# Patient Record
Sex: MALE | Race: WHITE | Employment: FULL TIME | URBAN - METROPOLITAN AREA
[De-identification: names, ages, dates, MRNs, and addresses within clinical notes are randomized per-mention and may not be internally consistent; named-entity substitution may affect disease eponyms.]

---

## 2017-09-21 ENCOUNTER — APPOINTMENT (OUTPATIENT)
Dept: CT IMAGING | Facility: HOSPITAL | Age: 52
DRG: 391 | End: 2017-09-21
Attending: EMERGENCY MEDICINE
Payer: COMMERCIAL

## 2017-09-21 ENCOUNTER — HOSPITAL ENCOUNTER (INPATIENT)
Facility: HOSPITAL | Age: 52
LOS: 4 days | Discharge: HOME | DRG: 391 | End: 2017-09-25
Attending: EMERGENCY MEDICINE | Admitting: HOSPITALIST
Payer: COMMERCIAL

## 2017-09-21 DIAGNOSIS — K57.92 ACUTE DIVERTICULITIS: Primary | ICD-10-CM

## 2017-09-21 PROBLEM — K63.5 COLON POLYPS: Status: ACTIVE | Noted: 2017-09-21

## 2017-09-21 PROBLEM — Z90.49 HISTORY OF APPENDECTOMY: Status: ACTIVE | Noted: 2017-09-21

## 2017-09-21 PROBLEM — Z90.49 HISTORY OF COLON RESECTION: Status: ACTIVE | Noted: 2017-09-21

## 2017-09-21 PROBLEM — Z80.0 FAMILY HISTORY OF COLON CANCER: Status: ACTIVE | Noted: 2017-09-21

## 2017-09-21 PROBLEM — S32.000A LUMBAR COMPRESSION FRACTURE (HCC): Status: ACTIVE | Noted: 2017-09-21

## 2017-09-21 PROBLEM — R10.32 ABDOMINAL PAIN, ACUTE, LEFT LOWER QUADRANT: Status: ACTIVE | Noted: 2017-09-21

## 2017-09-21 LAB
ALBUMIN SERPL-MCNC: 4 G/DL (ref 3.5–4.8)
ALP LIVER SERPL-CCNC: 41 U/L (ref 45–117)
ALT SERPL-CCNC: 21 U/L (ref 17–63)
AST SERPL-CCNC: 23 U/L (ref 15–41)
ATRIAL RATE: 69 BPM
BASOPHILS # BLD AUTO: 0.07 X10(3) UL (ref 0–0.1)
BASOPHILS NFR BLD AUTO: 0.5 %
BILIRUB SERPL-MCNC: 0.7 MG/DL (ref 0.1–2)
BILIRUB UR QL STRIP.AUTO: NEGATIVE
BUN BLD-MCNC: 17 MG/DL (ref 8–20)
CALCIUM BLD-MCNC: 9.1 MG/DL (ref 8.3–10.3)
CHLORIDE: 106 MMOL/L (ref 101–111)
CLARITY UR REFRACT.AUTO: CLEAR
CO2: 25 MMOL/L (ref 22–32)
COLOR UR AUTO: YELLOW
CREAT BLD-MCNC: 0.88 MG/DL (ref 0.7–1.3)
EOSINOPHIL # BLD AUTO: 0.09 X10(3) UL (ref 0–0.3)
EOSINOPHIL NFR BLD AUTO: 0.7 %
ERYTHROCYTE [DISTWIDTH] IN BLOOD BY AUTOMATED COUNT: 11.9 % (ref 11.5–16)
GLUCOSE BLD-MCNC: 105 MG/DL (ref 65–99)
GLUCOSE BLD-MCNC: 105 MG/DL (ref 70–99)
GLUCOSE UR STRIP.AUTO-MCNC: NEGATIVE MG/DL
HCT VFR BLD AUTO: 39.8 % (ref 37–53)
HGB BLD-MCNC: 13.7 G/DL (ref 13–17)
IMMATURE GRANULOCYTE COUNT: 0.05 X10(3) UL (ref 0–1)
IMMATURE GRANULOCYTE RATIO %: 0.4 %
KETONES UR STRIP.AUTO-MCNC: NEGATIVE MG/DL
LACTIC ACID: 0.7 MMOL/L (ref 0.5–2)
LEUKOCYTE ESTERASE UR QL STRIP.AUTO: NEGATIVE
LYMPHOCYTES # BLD AUTO: 1.84 X10(3) UL (ref 0.9–4)
LYMPHOCYTES NFR BLD AUTO: 14 %
M PROTEIN MFR SERPL ELPH: 7.6 G/DL (ref 6.1–8.3)
MCH RBC QN AUTO: 31.4 PG (ref 27–33.2)
MCHC RBC AUTO-ENTMCNC: 34.4 G/DL (ref 31–37)
MCV RBC AUTO: 91.3 FL (ref 80–99)
MONOCYTES # BLD AUTO: 1.1 X10(3) UL (ref 0.1–0.6)
MONOCYTES NFR BLD AUTO: 8.3 %
NEUTROPHIL ABS PRELIM: 10.03 X10 (3) UL (ref 1.3–6.7)
NEUTROPHILS # BLD AUTO: 10.03 X10(3) UL (ref 1.3–6.7)
NEUTROPHILS NFR BLD AUTO: 76.1 %
NITRITE UR QL STRIP.AUTO: NEGATIVE
P AXIS: 56 DEGREES
P-R INTERVAL: 146 MS
PH UR STRIP.AUTO: 7 [PH] (ref 4.5–8)
PLATELET # BLD AUTO: 228 10(3)UL (ref 150–450)
POTASSIUM SERPL-SCNC: 4.4 MMOL/L (ref 3.6–5.1)
PROT UR STRIP.AUTO-MCNC: NEGATIVE MG/DL
Q-T INTERVAL: 404 MS
QRS DURATION: 88 MS
QTC CALCULATION (BEZET): 432 MS
R AXIS: 29 DEGREES
RBC # BLD AUTO: 4.36 X10(6)UL (ref 4.3–5.7)
RBC UR QL AUTO: NEGATIVE
RED CELL DISTRIBUTION WIDTH-SD: 40 FL (ref 35.1–46.3)
SODIUM SERPL-SCNC: 139 MMOL/L (ref 136–144)
SP GR UR STRIP.AUTO: 1.01 (ref 1–1.03)
T AXIS: 35 DEGREES
UROBILINOGEN UR STRIP.AUTO-MCNC: 0.2 MG/DL
VENTRICULAR RATE: 69 BPM
WBC # BLD AUTO: 13.2 X10(3) UL (ref 4–13)

## 2017-09-21 PROCEDURE — 74177 CT ABD & PELVIS W/CONTRAST: CPT | Performed by: EMERGENCY MEDICINE

## 2017-09-21 PROCEDURE — 99284 EMERGENCY DEPT VISIT MOD MDM: CPT | Performed by: COLON & RECTAL SURGERY

## 2017-09-21 PROCEDURE — 99223 1ST HOSP IP/OBS HIGH 75: CPT | Performed by: HOSPITALIST

## 2017-09-21 RX ORDER — SODIUM CHLORIDE 9 MG/ML
INJECTION, SOLUTION INTRAVENOUS CONTINUOUS
Status: ACTIVE | OUTPATIENT
Start: 2017-09-21 | End: 2017-09-21

## 2017-09-21 RX ORDER — KETOROLAC TROMETHAMINE 30 MG/ML
30 INJECTION, SOLUTION INTRAMUSCULAR; INTRAVENOUS EVERY 6 HOURS PRN
Status: DISPENSED | OUTPATIENT
Start: 2017-09-21 | End: 2017-09-23

## 2017-09-21 RX ORDER — ONDANSETRON 2 MG/ML
4 INJECTION INTRAMUSCULAR; INTRAVENOUS EVERY 4 HOURS PRN
Status: DISCONTINUED | OUTPATIENT
Start: 2017-09-21 | End: 2017-09-21 | Stop reason: ALTCHOICE

## 2017-09-21 RX ORDER — KETOROLAC TROMETHAMINE 30 MG/ML
15 INJECTION, SOLUTION INTRAMUSCULAR; INTRAVENOUS EVERY 6 HOURS PRN
Status: DISPENSED | OUTPATIENT
Start: 2017-09-21 | End: 2017-09-23

## 2017-09-21 RX ORDER — HYDROMORPHONE HYDROCHLORIDE 1 MG/ML
1 INJECTION, SOLUTION INTRAMUSCULAR; INTRAVENOUS; SUBCUTANEOUS EVERY 30 MIN PRN
Status: DISCONTINUED | OUTPATIENT
Start: 2017-09-21 | End: 2017-09-21

## 2017-09-21 RX ORDER — ACETAMINOPHEN 500 MG
1000 TABLET ORAL EVERY 6 HOURS PRN
Status: DISCONTINUED | OUTPATIENT
Start: 2017-09-21 | End: 2017-09-25

## 2017-09-21 RX ORDER — HYDROMORPHONE HYDROCHLORIDE 1 MG/ML
1 INJECTION, SOLUTION INTRAMUSCULAR; INTRAVENOUS; SUBCUTANEOUS
Status: DISCONTINUED | OUTPATIENT
Start: 2017-09-21 | End: 2017-09-25

## 2017-09-21 RX ORDER — ONDANSETRON 2 MG/ML
4 INJECTION INTRAMUSCULAR; INTRAVENOUS ONCE
Status: COMPLETED | OUTPATIENT
Start: 2017-09-21 | End: 2017-09-21

## 2017-09-21 RX ORDER — SODIUM CHLORIDE 9 MG/ML
INJECTION, SOLUTION INTRAVENOUS CONTINUOUS
Status: DISCONTINUED | OUTPATIENT
Start: 2017-09-21 | End: 2017-09-25

## 2017-09-21 RX ORDER — HYDROMORPHONE HYDROCHLORIDE 1 MG/ML
0.5 INJECTION, SOLUTION INTRAMUSCULAR; INTRAVENOUS; SUBCUTANEOUS EVERY 30 MIN PRN
Status: DISCONTINUED | OUTPATIENT
Start: 2017-09-21 | End: 2017-09-21 | Stop reason: ALTCHOICE

## 2017-09-21 RX ORDER — SODIUM CHLORIDE 9 MG/ML
INJECTION, SOLUTION INTRAVENOUS CONTINUOUS
Status: DISCONTINUED | OUTPATIENT
Start: 2017-09-21 | End: 2017-09-21

## 2017-09-21 RX ORDER — DIPHENHYDRAMINE HCL 25 MG
25 CAPSULE ORAL EVERY 4 HOURS PRN
Status: DISCONTINUED | OUTPATIENT
Start: 2017-09-21 | End: 2017-09-25

## 2017-09-21 RX ORDER — HYDROMORPHONE HYDROCHLORIDE 1 MG/ML
1 INJECTION, SOLUTION INTRAMUSCULAR; INTRAVENOUS; SUBCUTANEOUS ONCE
Status: COMPLETED | OUTPATIENT
Start: 2017-09-21 | End: 2017-09-21

## 2017-09-21 RX ORDER — ONDANSETRON 2 MG/ML
4 INJECTION INTRAMUSCULAR; INTRAVENOUS EVERY 6 HOURS PRN
Status: DISCONTINUED | OUTPATIENT
Start: 2017-09-21 | End: 2017-09-25

## 2017-09-21 RX ORDER — HYDROMORPHONE HYDROCHLORIDE 1 MG/ML
1 INJECTION, SOLUTION INTRAMUSCULAR; INTRAVENOUS; SUBCUTANEOUS EVERY 4 HOURS PRN
Status: DISCONTINUED | OUTPATIENT
Start: 2017-09-21 | End: 2017-09-21

## 2017-09-21 NOTE — ED PROVIDER NOTES
Patient Seen in: [facility] Emergency Department    History   Patient presents with:  Abdomen/Flank Pain (GI/)    Stated Complaint: abd pain    HPI    79-year-old male with history of diverticulosis presents emergency room with chief complaint of pain  Other systems are as noted in HPI. Constitutional and vital signs reviewed. All other systems reviewed and negative except as noted above. PSFH elements reviewed from today and agreed except as otherwise stated in HPI.     Physical Exam   ED Abnormal            Final result                 Please view results for these tests on the individual orders.    URINALYSIS WITH CULTURE REFLEX       CT ABDOMEN PELVIS WITH CONTRAST      IMPRESSION:  Pronounced soft tissue stranding is noted adjacent t specified.     Medications Prescribed:  Current Discharge Medication List        Present on Admission  Date Reviewed: 10/14/2016          ICD-10-CM Noted POA    Acute diverticulitis K57.92 10/14/2016 Unknown

## 2017-09-21 NOTE — PROGRESS NOTES
Addendum  Patient seen and examined  Ill appearing.   Dizzy and diaphoretic but no vision deficits  Chest: CTA B/L  CVS: S1, S2, RRR  ABD: Soft, epigastric tenderness, RLQ tenderness with guarding  EXT: No c/c    Imaging: Reviewed    Suspecting sepsis 2/2 d

## 2017-09-21 NOTE — CONSULTS
[facility]  Report of Consultation    Tremayne Sarafi Patient Status:  Emergency    1965 MRN [id number]   Location [address] Attending No att. providers found   Hosp Day # 0 PCP Palak Garsia MD     Reason for [facility] ([facility]) he \"has a bad back\" from previous injury. Apparently the patient fell off a roof and has at least 3 lumbar vertebrae that are fractured. Patient's pain is 4/10 now. Is worse with movement or any examination of the abdomen.     Past medical history: L Jose Luis Wright MD;  Location: [facility]  2000: ORTHOPEDIC SURG (PBP) Left      Comment: knee arthroscopy  Family History   Problem Relation Age of Onset   • Cancer Father    • Hypertension Father    • Cancer Mother       reports that he has never clubbing or cyanosis. Skin: Normal texture and turgor. Laboratory Data:  I have personally reviewed the patient's CT scan and have provided my own interpretation.   There is a focus of diverticulitis near what would appear to be the descending sig adenopathy. BOWEL/MESENTERY:  There is a 10 cm segment of marked acute diverticulitis within the splenic flexure to mid left colon with infiltration of the fat with some thickening of the left anterior pararenal space and left paracolic gutter.  No absce COMPARISON:     [facility] , CT ABD(C/S)/PELVIS(C) (SET), 10/26/2009,        13:17.                INDICATIONS:     llq pain, rule out diverticulitis               TECHNIQUE:     CT scanning was performed from the dome of the        diaphragm to the pubic sy from the dome of the diaphragm to the pubic symphysis with non-ionic intravenous contrast material. Post contrast coronal MPR imaging was performed. Dose reduction techniques were used.  Dose information is   transmitted to the [facility] localized perforation cannot be excluded. Agree with preliminary radiology report from [address] [place] [address] radiology.               Dictated by: Nikkie Collado MD on 9/21/2017 at 6:00       Approved by: Nikkie Collado MD                  Recent Labs   Lab  0 may require urgent surgical intervention on this admission. 4. I explained and counseled the patient on all the above listed findings and potential outcomes.   He is aware of the treatment course of IV antibiotics, with short hospitalization, followed by c

## 2017-09-21 NOTE — CM/SW NOTE
Patient was screened during rounds and no needs are identified at this time. RN to contact SW/CM if needs arise. 09/21/17 1500   CM/SW Screening   Referral [phone] staff; Chart review;Nursing rounds   Patient's Ment

## 2017-09-21 NOTE — H&P
[facility] HOSPITALIST  History and Physical     Kolby Greene Patient Status:  Emergency    1965 MRN [id number]   Location [address] [facility] [address] Attending Anton Guevara, [facility] Day # 0 PCP Jossie Armenta MD     Chief Complaint: Carmen Bates Father    • Hypertension Father    • Cancer Mother        Allergies: No Known Allergies    Medications:    No current facility-administered medications on file prior to encounter.    Current Outpatient Prescriptions on File Prior to Encounter:  Acetaminophe data reviewed in Epic. ASSESSMENT / PLAN:     1. Acute diverticulitis with possible microperforation  1. NPO  2. IVF  3. Pain meds  4. IV zosyn  2. Chronic back pain  3.  Leukocytosis          Quality:  · DVT Prophylaxis: SCDS  · CODE status: full  · F

## 2017-09-22 LAB
BASOPHILS # BLD AUTO: 0.06 X10(3) UL (ref 0–0.1)
BASOPHILS NFR BLD AUTO: 0.4 %
BUN BLD-MCNC: 14 MG/DL (ref 8–20)
CALCIUM BLD-MCNC: 8.7 MG/DL (ref 8.3–10.3)
CHLORIDE: 105 MMOL/L (ref 101–111)
CO2: 28 MMOL/L (ref 22–32)
CREAT BLD-MCNC: 0.89 MG/DL (ref 0.7–1.3)
EOSINOPHIL # BLD AUTO: 0.14 X10(3) UL (ref 0–0.3)
EOSINOPHIL NFR BLD AUTO: 0.9 %
ERYTHROCYTE [DISTWIDTH] IN BLOOD BY AUTOMATED COUNT: 12.1 % (ref 11.5–16)
GLUCOSE BLD-MCNC: 86 MG/DL (ref 70–99)
HCT VFR BLD AUTO: 40.2 % (ref 37–53)
HGB BLD-MCNC: 13.2 G/DL (ref 13–17)
IMMATURE GRANULOCYTE COUNT: 0.05 X10(3) UL (ref 0–1)
IMMATURE GRANULOCYTE RATIO %: 0.3 %
LYMPHOCYTES # BLD AUTO: 2.94 X10(3) UL (ref 0.9–4)
LYMPHOCYTES NFR BLD AUTO: 19.6 %
MCH RBC QN AUTO: 30.8 PG (ref 27–33.2)
MCHC RBC AUTO-ENTMCNC: 32.8 G/DL (ref 31–37)
MCV RBC AUTO: 93.9 FL (ref 80–99)
MONOCYTES # BLD AUTO: 1.25 X10(3) UL (ref 0.1–0.6)
MONOCYTES NFR BLD AUTO: 8.3 %
NEUTROPHIL ABS PRELIM: 10.57 X10 (3) UL (ref 1.3–6.7)
NEUTROPHILS # BLD AUTO: 10.57 X10(3) UL (ref 1.3–6.7)
NEUTROPHILS NFR BLD AUTO: 70.5 %
PLATELET # BLD AUTO: 250 10(3)UL (ref 150–450)
POTASSIUM SERPL-SCNC: 3.9 MMOL/L (ref 3.6–5.1)
RBC # BLD AUTO: 4.28 X10(6)UL (ref 4.3–5.7)
RED CELL DISTRIBUTION WIDTH-SD: 42 FL (ref 35.1–46.3)
SODIUM SERPL-SCNC: 139 MMOL/L (ref 136–144)
WBC # BLD AUTO: 15 X10(3) UL (ref 4–13)

## 2017-09-22 PROCEDURE — 99232 SBSQ HOSP IP/OBS MODERATE 35: CPT | Performed by: HOSPITALIST

## 2017-09-22 PROCEDURE — 99233 SBSQ HOSP IP/OBS HIGH 50: CPT | Performed by: COLON & RECTAL SURGERY

## 2017-09-22 NOTE — PROGRESS NOTES
[facility]  Progress Note    Fabiola Minor Patient Status:  Inpatient    1965 MRN [id number]   [facility] 5NW-A Attending Osmany Fontanez MD   Hosp Day # 1 PCP Marc Mathis MD     Subjective:  Patient up to shower.  Abdominal linda diverticulitis     Abdominal pain, acute, left lower quadrant     Sepsis ([address]      Acute diverticulitis, clinically improving  History of low anterior resection for diverticulitis  White count further elevated this morning, however due to an IV error anti

## 2017-09-22 NOTE — PLAN OF CARE
GASTROINTESTINAL - ADULT    • Minimal or absence of nausea and vomiting Progressing    • Maintains or returns to baseline bowel function Progressing    • Maintains adequate nutritional intake (undernourished) Progressing        HEMATOLOGIC - ADULT    • Holli

## 2017-09-22 NOTE — PROGRESS NOTES
NURSING ADMISSION NOTE      Patient admitted via Ambulance. Oriented to room. Safety precautions initiated. Bed in low position. Call light in reach. Patient alert and oriented. C/o \"feeling lightheaded\" and diaphoretic. VSS. Dr. Zarina Mora paged.

## 2017-09-22 NOTE — PLAN OF CARE
GASTROINTESTINAL - ADULT    • Minimal or absence of nausea Progressing    • Maintains or returns to baseline bowel function Progressing          PAIN - ADULT    • Verbalizes/displays adequate comfort level or patient's stated pain goal Progressing

## 2017-09-22 NOTE — PROGRESS NOTES
[facility]  Progress Note     Chang Wong Patient Status:  Inpatient    1965 MRN [id number]   [facility] 5NW-A Attending Royal Vivas MD   Hosp Day # 1 PCP Sravani Cheng MD     Chief Complaint: LLQ pain    S: Patient repo pain with radiation to groin/LLE, nondistended  2. Continue Zosyn IV, npo, IVF, pain mgmt  2. Possible peritonitis 2/2 #1, some guarding on exam  3.  Leukocytosis 2/2 #1    Quality:  · DVT Prophylaxis: scds  · CODE status: full    Estimated date of discharg

## 2017-09-23 ENCOUNTER — APPOINTMENT (OUTPATIENT)
Dept: CT IMAGING | Facility: HOSPITAL | Age: 52
DRG: 391 | End: 2017-09-23
Attending: COLON & RECTAL SURGERY
Payer: COMMERCIAL

## 2017-09-23 LAB
BASOPHILS # BLD AUTO: 0.05 X10(3) UL (ref 0–0.1)
BASOPHILS NFR BLD AUTO: 0.6 %
EOSINOPHIL # BLD AUTO: 0.19 X10(3) UL (ref 0–0.3)
EOSINOPHIL NFR BLD AUTO: 2.1 %
ERYTHROCYTE [DISTWIDTH] IN BLOOD BY AUTOMATED COUNT: 11.5 % (ref 11.5–16)
HCT VFR BLD AUTO: 36.7 % (ref 37–53)
HGB BLD-MCNC: 12.5 G/DL (ref 13–17)
IMMATURE GRANULOCYTE COUNT: 0.02 X10(3) UL (ref 0–1)
IMMATURE GRANULOCYTE RATIO %: 0.2 %
LYMPHOCYTES # BLD AUTO: 1.9 X10(3) UL (ref 0.9–4)
LYMPHOCYTES NFR BLD AUTO: 21 %
MCH RBC QN AUTO: 31.3 PG (ref 27–33.2)
MCHC RBC AUTO-ENTMCNC: 34.1 G/DL (ref 31–37)
MCV RBC AUTO: 91.8 FL (ref 80–99)
MONOCYTES # BLD AUTO: 0.71 X10(3) UL (ref 0.1–0.6)
MONOCYTES NFR BLD AUTO: 7.9 %
NEUTROPHIL ABS PRELIM: 6.17 X10 (3) UL (ref 1.3–6.7)
NEUTROPHILS # BLD AUTO: 6.17 X10(3) UL (ref 1.3–6.7)
NEUTROPHILS NFR BLD AUTO: 68.2 %
PLATELET # BLD AUTO: 227 10(3)UL (ref 150–450)
RBC # BLD AUTO: 4 X10(6)UL (ref 4.3–5.7)
RED CELL DISTRIBUTION WIDTH-SD: 38.9 FL (ref 35.1–46.3)
WBC # BLD AUTO: 9 X10(3) UL (ref 4–13)

## 2017-09-23 PROCEDURE — 99232 SBSQ HOSP IP/OBS MODERATE 35: CPT | Performed by: HOSPITALIST

## 2017-09-23 PROCEDURE — 74177 CT ABD & PELVIS W/CONTRAST: CPT | Performed by: COLON & RECTAL SURGERY

## 2017-09-23 NOTE — PLAN OF CARE
Received patient a/ox4. Continues to c/o of abdominal tenderness and left hip/leg pain and back pain. He states the hip/leg/back pain is worse than the abdominal discomfort. He ambulated the halls lastnight and this morning and tolerated well.  He was updat

## 2017-09-23 NOTE — PROGRESS NOTES
[facility]  Progress Note    Tremayne Wagner Patient Status:  Inpatient    1965 MRN [id number]   [facility] 5NW-A Attending Chrystal Oppenheim, MD   Hosp Day # 2 PCP Palak Garsia MD     Subjective:  No new complaints, sever left flank psoas sign  Significant medical and surgical decision making today regarding potential psoas abscess, counseled patient significantly regarding need for CT and it's possible outcomes. Plan:  1. IV's, Ambulate, DVT prophylaxis  2. NPO except H20  3.  CT sc

## 2017-09-23 NOTE — PROGRESS NOTES
[facility]  Progress Note     Macrina Hernandez Patient Status:  Inpatient    1965 MRN [id number]   [facility] 5NW-A Attending Lashawn Boogie MD   Hosp Day # 2 PCP Cas Gordon MD     Chief Complaint: LLQ pain    S: Patient repo 3.375 g Intravenous Q8H       ASSESSMENT / PLAN:     1. Sepsis 2/2 acute diverticulitis. Lflank and lower quadrant pain- worsening. Possible periotonitis/perforation vs psoas abscess/inflammation  1. Repeat CT abd/pelvis today  2.  Appreciate Dr. Eryn Odom

## 2017-09-23 NOTE — PROGRESS NOTES
I personally reviewed the images of the CT scan of the abdomen and pelvis performed on 9/23/2017. I agree with the radiologist interpretation.   Overall the inflammation in the left lower quadrant around the sigmoid colon has improved when compared to the

## 2017-09-24 PROCEDURE — 99232 SBSQ HOSP IP/OBS MODERATE 35: CPT | Performed by: HOSPITALIST

## 2017-09-24 RX ORDER — KETOROLAC TROMETHAMINE 30 MG/ML
15 INJECTION, SOLUTION INTRAMUSCULAR; INTRAVENOUS EVERY 6 HOURS PRN
Status: DISCONTINUED | OUTPATIENT
Start: 2017-09-24 | End: 2017-09-25

## 2017-09-24 RX ORDER — KETOROLAC TROMETHAMINE 30 MG/ML
30 INJECTION, SOLUTION INTRAMUSCULAR; INTRAVENOUS EVERY 6 HOURS PRN
Status: DISCONTINUED | OUTPATIENT
Start: 2017-09-24 | End: 2017-09-25

## 2017-09-24 NOTE — PROGRESS NOTES
09/24/17 1513   Clinical Encounter Type   Visited With Patient and family together   Routine Visit Introduction   Continue Visiting No   Patient Spiritual Encounters   Spiritual Interventions  provided emotional support and blessing

## 2017-09-24 NOTE — PROGRESS NOTES
[facility]  Progress Note    Irl Stalling Patient Status:  Inpatient    1965 MRN [id number]   [facility] 5NW-A Attending Denisse Peoples MD   Hosp Day # 3 PCP Nora Key MD     Subjective:  No acute events overnight.   Patient is tolerating a clear liquid diet today. He remains afebrile. We will advance him today to a soft low fiber diet. The patient was instructed to go very slow with increasing the amount of his oral intake. Continue IV antibiotics for now.       I spent  3

## 2017-09-24 NOTE — PROGRESS NOTES
[facility]  Progress Note     Tremayne Wagner Patient Status:  Inpatient    1965 MRN [id number]   [facility] 5NW-A Attending Chrystal Oppenheim, MD   Hosp Day # 3 PCP Palak Garsia MD     Chief Complaint: LLQ pain    S: Patient repo surgery  4. Continue Zosyn IV, npo, IVF, pain mgmt  2. Possible peritonitis 2/2 #1- plan as above   3. LLE pain 2/2 psoas inflammation  4.  Leukocytosis 2/2 #1, resolved    Quality:  · DVT Prophylaxis: scds  · CODE status: full    Estimated date of discharg

## 2017-09-25 VITALS
OXYGEN SATURATION: 94 % | WEIGHT: 190.63 LBS | TEMPERATURE: 98 F | RESPIRATION RATE: 19 BRPM | HEIGHT: 72 IN | HEART RATE: 60 BPM | BODY MASS INDEX: 25.82 KG/M2 | DIASTOLIC BLOOD PRESSURE: 85 MMHG | SYSTOLIC BLOOD PRESSURE: 144 MMHG

## 2017-09-25 PROCEDURE — 99233 SBSQ HOSP IP/OBS HIGH 50: CPT | Performed by: COLON & RECTAL SURGERY

## 2017-09-25 PROCEDURE — 99239 HOSP IP/OBS DSCHRG MGMT >30: CPT | Performed by: HOSPITALIST

## 2017-09-25 RX ORDER — AMOXICILLIN AND CLAVULANATE POTASSIUM 875; 125 MG/1; MG/1
1 TABLET, FILM COATED ORAL 2 TIMES DAILY
Qty: 28 TABLET | Refills: 0 | Status: SHIPPED | OUTPATIENT
Start: 2017-09-25 | End: 2017-10-09

## 2017-09-25 RX ORDER — METRONIDAZOLE 250 MG/1
250 TABLET ORAL 3 TIMES DAILY
Qty: 42 TABLET | Refills: 0 | Status: SHIPPED | OUTPATIENT
Start: 2017-09-25 | End: 2017-10-09

## 2017-09-25 RX ORDER — HYDROCODONE BITARTRATE AND ACETAMINOPHEN 5; 325 MG/1; MG/1
1-2 TABLET ORAL
Qty: 20 TABLET | Refills: 0 | Status: SHIPPED | OUTPATIENT
Start: 2017-09-25 | End: 2017-10-05 | Stop reason: ALTCHOICE

## 2017-09-25 NOTE — DISCHARGE SUMMARY
[facility] [place] [facility]  DISCHARGE SUMMARY     Samantha Fernando Patient Status:  Inpatient    1965 MRN [id number]   [facility] 5NW-A Attending Nick Sanchez MD   [facility] Day # 4 PCP Kandice Diaz MD     Date of Admission: 2017  Date of Fady Gilman consulted. He continued on IV fluids, antiemetics, pain management. There are signs of possible peritonitis and patient had repeat CT scan due to ongoing pain with movement of left lower extremity.   His pain continues to improve and diet was advanced as visit in 1 week  For surgical planning, follow up diverticulitis    Sondra Avila, [address] [place] [address]    Schedule an appointment as soon as possible for a visit in 1 week        -----------------------------------

## 2017-09-25 NOTE — PROGRESS NOTES
NURSING DISCHARGE NOTE    Discharged Home via Wheelchair. Accompanied by Support staff  Belongings Taken by patient/family. Patient alert and oriented. VSS. Iv discontinued. Discharge instructions and prescriptions given to patient.  He verbalized u

## 2017-09-25 NOTE — PROGRESS NOTES
[facility]  Progress Note    Laney Palomino Patient Status:  Inpatient    1965 MRN [id number]   [facility] 5NW-A Attending Kirti Diaz MD   Hosp Day # 4 PCP Triston Perkins MD     Subjective:  Patient sitting up in bed, pain imp Dr. Kaye Gave for surgical planning next week    I spent 26 minutes face to face with the patient. More than 50% of that time was spent counseling the patient and/or on coordination of care.  The diagnosis, prognosis, and general treatment was explained to the

## 2017-09-25 NOTE — PROGRESS NOTES
[facility]  Progress Note     Ranjan Aguilera Patient Status:  Inpatient    1965 MRN [id number]   [facility] 5NW-A Attending Roger Jones MD   Hosp Day # 4 PCP Giuliana Jama MD     Chief Complaint: LLQ pain    S: Patient repo 2/2 #1, resolved    Quality:  · DVT Prophylaxis: scds  · CODE status: full    Estimated date of discharge: later today if does ok with lunch.      Plan of care discussed with patient, RN, surgery PA.  abx were ordered at d/c per surgery    Marie Boyd

## 2017-09-26 NOTE — PAYOR COMM NOTE
--------------  DISCHARGE REVIEW    Payor: [address] #:  [id number]  Authorization Number: [id number]    Admit date: 9/21/17  Admit time:  0813    Discharge Date: 9/25/2017  home

## 2017-10-05 ENCOUNTER — OFFICE VISIT (OUTPATIENT)
Dept: SURGERY | Facility: CLINIC | Age: 52
End: 2017-10-05

## 2017-10-05 VITALS
WEIGHT: 183 LBS | RESPIRATION RATE: 16 BRPM | SYSTOLIC BLOOD PRESSURE: 142 MMHG | DIASTOLIC BLOOD PRESSURE: 98 MMHG | HEIGHT: 72 IN | HEART RATE: 67 BPM | BODY MASS INDEX: 24.79 KG/M2

## 2017-10-05 DIAGNOSIS — R10.32 ABDOMINAL PAIN, ACUTE, LEFT LOWER QUADRANT: ICD-10-CM

## 2017-10-05 DIAGNOSIS — Z90.49 HISTORY OF APPENDECTOMY: ICD-10-CM

## 2017-10-05 DIAGNOSIS — Z80.0 FAMILY HISTORY OF COLON CANCER: ICD-10-CM

## 2017-10-05 DIAGNOSIS — K57.92 ACUTE DIVERTICULITIS: Primary | ICD-10-CM

## 2017-10-05 DIAGNOSIS — D12.6 ADENOMATOUS POLYP OF COLON, UNSPECIFIED PART OF COLON: ICD-10-CM

## 2017-10-05 DIAGNOSIS — Z90.49 HISTORY OF COLON RESECTION: ICD-10-CM

## 2017-10-05 PROCEDURE — 99215 OFFICE O/P EST HI 40 MIN: CPT | Performed by: COLON & RECTAL SURGERY

## 2017-10-05 NOTE — PATIENT INSTRUCTIONS
This patient presents for further acute and long-term surgical decision making regarding recurrent diverticulitis. This patient has resolving diverticulitis, multiply recurrent.   This patient has had prior right side colitis, prior transverse colon dive current episode of descending/sigmoid junction diverticulitis. It is difficult to say that a single operation of removal of the current acute focus would be his best result and answer. He would be left with only a small amount of colon.   The transverse

## 2017-10-05 NOTE — H&P
New Patient Visit Note       Active Problems      1. Acute diverticulitis    2. Abdominal pain, acute, left lower quadrant    3. Adenomatous polyp of colon, unspecified part of colon    4.  Family history of colon cancer, both mother and father at age 62, b deep left lower quadrant pain to moderate activity.     This patient's difficult surgical decision making involves the fact that he has had previous colon resection for diverticulitis, previous right-sided colitis, previous transverse colon diverticulitis, first cousins with colon cancer? yes     Are you taking aspirin? no   Are you taking Motrin, Naprosyn, Aleve or ibuprofen? yes     Are you taking prednisone or other steroids? no   Are you taking arthritis medicine other than prednisone?  yes   Do you take Number of children:               Social History Main Topics    Smoking status: Never Smoker                                                                Smokeless tobacco: Never Used                        Alcohol use:  Yes                Comment: we tenderness in the left lower quadrant. Clinical exam reveals the abdomen to be soft, nontender, nondistended, good bowel sounds. His only tenderness is to very deep palpation in the left lower quadrant.   He has a previous well-healed lengthy hand-as is giving him some nausea and indigestion. He points to his left lower quadrant he has some remaining left lower quadrant tenderness.     This patient overall feels greatly improved, and only has minimal residual deep left lower quadrant pain to moderate

## 2017-11-07 ENCOUNTER — OFFICE VISIT (OUTPATIENT)
Dept: SURGERY | Facility: CLINIC | Age: 52
End: 2017-11-07

## 2017-11-07 VITALS
HEIGHT: 72 IN | DIASTOLIC BLOOD PRESSURE: 80 MMHG | WEIGHT: 185 LBS | SYSTOLIC BLOOD PRESSURE: 112 MMHG | BODY MASS INDEX: 25.06 KG/M2

## 2017-11-07 DIAGNOSIS — Z90.49 HISTORY OF APPENDECTOMY: ICD-10-CM

## 2017-11-07 DIAGNOSIS — Z90.49 HISTORY OF COLON RESECTION: ICD-10-CM

## 2017-11-07 DIAGNOSIS — K57.92 ACUTE DIVERTICULITIS: Primary | ICD-10-CM

## 2017-11-07 DIAGNOSIS — D12.6 ADENOMATOUS POLYP OF COLON, UNSPECIFIED PART OF COLON: ICD-10-CM

## 2017-11-07 DIAGNOSIS — Z80.0 FAMILY HISTORY OF COLON CANCER: ICD-10-CM

## 2017-11-07 PROCEDURE — 99214 OFFICE O/P EST MOD 30 MIN: CPT | Performed by: COLON & RECTAL SURGERY

## 2017-11-08 NOTE — PATIENT INSTRUCTIONS
This patient presents for continued evaluation, treatment, and surgical decision making regarding recurrent episodes of diverticulitis. This patient has a resolving episode of diverticulitis.   The patient has had prior right-sided colitis, prior transve surgical decision making.   The patient has had previous colon resection for diverticulitis as well as previous right-sided colitis, previous transverse colonic diverticulitis, and most recently an episode of descending/sigmoid junction diverticulitis with

## 2017-11-08 NOTE — PROGRESS NOTES
Follow Up Visit Note       Active Problems      1. Acute diverticulitis    2. Adenomatous polyp of colon, unspecified part of colon    3. Family history of colon cancer, both mother and father at age 62, both  from the disease    4.  History of appendec The physician performed all medical decision making. Presley French PA-C    My total face time with this patient was 30 minutes. Greater than half of our visit was spent in counseling the patient on the above listed diagnoses and treatment options. Smokeless tobacco: Never Used                        Alcohol use: Yes                Comment: weekends    Drug use: No                 Current Outpatient Prescriptions:  Naproxen Sodium (ALEVE OR) Take by mouth.  Disp:  Rfl:    Acetamin non-ionic intravenous contrast material. Post contrast coronal MPR imaging was performed. Dose reduction techniques were used.  Dose information is   transmitted to the [facility] ([address] [place] [address] of Radiology) Lucila Flores [address] ([address]) which inclu bony lesion or fracture. LUNG BASES:  Normal.  No visible pulmonary or pleural disease.       =====  CONCLUSION:    1.  There is a approximately 6 cm segment of acute sigmoid diverticulitis with a mild to moderate degree of surrounding inflammatory benson rectum. The patient's most recent colonoscopy was in June 2017 by another surgeon.   Additionally, the patient has a substantial family history of colon cancer in his mother, father, and paternal grandmother all of who were diagnosed with colon cancer at follow-up in 3 months time. Again, if the patient has any symptoms prior to his scheduled appointment that in any way reflect his previous colitis or diverticulitis he should contact our office immediately.   Additionally, the patient will be due for colon

## 2018-10-30 ENCOUNTER — APPOINTMENT (OUTPATIENT)
Dept: GENERAL RADIOLOGY | Age: 53
End: 2018-10-30
Attending: FAMILY MEDICINE
Payer: COMMERCIAL

## 2018-10-30 ENCOUNTER — HOSPITAL ENCOUNTER (OUTPATIENT)
Age: 53
Discharge: ANOTHER HOSPITAL | End: 2018-10-30
Attending: FAMILY MEDICINE
Payer: COMMERCIAL

## 2018-10-30 ENCOUNTER — HOSPITAL ENCOUNTER (EMERGENCY)
Facility: HOSPITAL | Age: 53
Discharge: HOME | End: 2018-10-31
Attending: EMERGENCY MEDICINE
Payer: COMMERCIAL

## 2018-10-30 ENCOUNTER — APPOINTMENT (OUTPATIENT)
Dept: CT IMAGING | Age: 53
End: 2018-10-30
Attending: FAMILY MEDICINE
Payer: COMMERCIAL

## 2018-10-30 VITALS
SYSTOLIC BLOOD PRESSURE: 144 MMHG | OXYGEN SATURATION: 98 % | RESPIRATION RATE: 18 BRPM | TEMPERATURE: 98 F | DIASTOLIC BLOOD PRESSURE: 61 MMHG | HEART RATE: 67 BPM

## 2018-10-30 DIAGNOSIS — R10.32 ABDOMINAL PAIN, ACUTE, LEFT LOWER QUADRANT: ICD-10-CM

## 2018-10-30 DIAGNOSIS — M54.5 ACUTE LEFT-SIDED LOW BACK PAIN, WITH SCIATICA PRESENCE UNSPECIFIED: Primary | ICD-10-CM

## 2018-10-30 DIAGNOSIS — M25.552 GREATER TROCHANTERIC PAIN SYNDROME OF LEFT LOWER EXTREMITY: Primary | ICD-10-CM

## 2018-10-30 PROCEDURE — 99284 EMERGENCY DEPT VISIT MOD MDM: CPT

## 2018-10-30 PROCEDURE — 73502 X-RAY EXAM HIP UNI 2-3 VIEWS: CPT | Performed by: FAMILY MEDICINE

## 2018-10-30 PROCEDURE — 87086 URINE CULTURE/COLONY COUNT: CPT | Performed by: FAMILY MEDICINE

## 2018-10-30 PROCEDURE — 99215 OFFICE O/P EST HI 40 MIN: CPT

## 2018-10-30 PROCEDURE — 96361 HYDRATE IV INFUSION ADD-ON: CPT

## 2018-10-30 PROCEDURE — 99204 OFFICE O/P NEW MOD 45 MIN: CPT

## 2018-10-30 PROCEDURE — 74177 CT ABD & PELVIS W/CONTRAST: CPT | Performed by: FAMILY MEDICINE

## 2018-10-30 PROCEDURE — 81002 URINALYSIS NONAUTO W/O SCOPE: CPT | Performed by: FAMILY MEDICINE

## 2018-10-30 PROCEDURE — 85025 COMPLETE CBC W/AUTO DIFF WBC: CPT | Performed by: FAMILY MEDICINE

## 2018-10-30 PROCEDURE — 96374 THER/PROPH/DIAG INJ IV PUSH: CPT

## 2018-10-30 PROCEDURE — 80047 BASIC METABLC PNL IONIZED CA: CPT

## 2018-10-30 RX ORDER — KETOROLAC TROMETHAMINE 30 MG/ML
30 INJECTION, SOLUTION INTRAMUSCULAR; INTRAVENOUS ONCE
Status: COMPLETED | OUTPATIENT
Start: 2018-10-30 | End: 2018-10-30

## 2018-10-30 RX ORDER — LIDOCAINE 50 MG/G
1 PATCH TOPICAL DAILY
Status: DISCONTINUED | OUTPATIENT
Start: 2018-10-30 | End: 2018-10-31

## 2018-10-30 RX ORDER — ACETAMINOPHEN 500 MG
1000 TABLET ORAL ONCE
Status: COMPLETED | OUTPATIENT
Start: 2018-10-30 | End: 2018-10-30

## 2018-10-30 RX ORDER — SODIUM CHLORIDE 9 MG/ML
1000 INJECTION, SOLUTION INTRAVENOUS ONCE
Status: COMPLETED | OUTPATIENT
Start: 2018-10-30 | End: 2018-10-30

## 2018-10-30 RX ORDER — DEXAMETHASONE SODIUM PHOSPHATE 4 MG/ML
10 VIAL (ML) INJECTION ONCE
Status: COMPLETED | OUTPATIENT
Start: 2018-10-30 | End: 2018-10-30

## 2018-10-30 NOTE — ED INITIAL ASSESSMENT (HPI)
Patient presents with left lower back pain x 3 days. Denies numbness or tingling. Wraps around to left buttock to left lower leg. Fall 2 yrs ago with L2,3 rupture

## 2018-10-31 VITALS
OXYGEN SATURATION: 97 % | DIASTOLIC BLOOD PRESSURE: 84 MMHG | WEIGHT: 190 LBS | BODY MASS INDEX: 25.73 KG/M2 | HEART RATE: 63 BPM | TEMPERATURE: 98 F | RESPIRATION RATE: 15 BRPM | HEIGHT: 72 IN | SYSTOLIC BLOOD PRESSURE: 145 MMHG

## 2018-10-31 RX ORDER — CYCLOBENZAPRINE HCL 10 MG
10 TABLET ORAL 3 TIMES DAILY PRN
Qty: 20 TABLET | Refills: 0 | Status: SHIPPED | OUTPATIENT
Start: 2018-10-31 | End: 2018-11-07

## 2018-10-31 RX ORDER — LIDOCAINE 50 MG/G
1 PATCH TOPICAL EVERY 24 HOURS
Qty: 5 PATCH | Refills: 0 | Status: SHIPPED | OUTPATIENT
Start: 2018-10-31 | End: 2018-11-14

## 2018-10-31 NOTE — ED NOTES
Pt back to room from using the washroom. Pt voided without any problems. Pt noted ambulatory with steady gait. Pt claims pain is 8-9/10 on ambulation and \"OK\" when he is laying down. ER MD - Dr. Hitesh Alexander aware.

## 2018-10-31 NOTE — ED PROVIDER NOTES
Patient Seen in: [facility] Emergency Department    History   Patient presents with:  Back Pain (musculoskeletal)    Stated Complaint: back pain    HPI  Patient is a 44-year-old hip back groin pain. Symptoms started on Saturday.   He has had some ch by Olivia Gutierrez MD at [facility] ENDOSCOPY   • COLONOSCOPY, POSSIBLE BIOPSY, POSSIBLE POLYPECTOMY 85468 N/A 5/4/2017    Performed by Olivia Gutierrez MD at [address] [place] [address], POSSIBLE BIOPSY, POSSIBLE POLYPECTOMY 64240 N/A 7/2/2015    Performed by Olivia Gutierrez MD is present bilateral lower extremities. Neurological: He is alert and oriented to person, place, and time. He exhibits normal muscle tone. Coordination normal.   Skin: Skin is warm and dry. No rash noted.    Good distal perfusion   Psychiatric: He has a n for Muscle spasms. Qty: 20 tablet Refills: 0    lidocaine 5 % External Patch  Place 1 patch onto the skin daily.   Qty: 5 patch Refills: 0

## 2018-10-31 NOTE — ED NOTES
2335 - Pt asked if what was the name of the medication that was called in to his Pharmacy from the [address] [place] [address]. Undersigned RN reviewed his chart and records showed that there was no prescription sent (called in or given).  ER MD - Dr. Sharonda Whiteside was notified and he hims

## 2018-10-31 NOTE — ED NOTES
0395 - Upon giving Pt's discharge instructions, Pt insisted that the [address] [place] [address] doctor called in a prescription for him. Kallie Cesar even asked me for my Pharmacy and I gave them the one in [place] [id number] since my Pharmacy is not open 24 hrs.  If I had known that I would be in th

## 2018-10-31 NOTE — ED NOTES
Undersigned RN to [facility] for Pt rounds. Pt not in room at this time. Pt is currently in the washroom.

## 2018-10-31 NOTE — ED PROVIDER NOTES
Patient Seen in: [facility] END    History   Patient presents with:  Back Pain (musculoskeletal)    Stated Complaint: BACK PAIN     HPI    41-year-old male presently chief complaint of acute left lower back pain radiating to his lef POSSIBLE POLYPECTOMY 88555 N/A 7/2/2015    Performed by Hernan Watkins MD at [address] [place] [address] ([facility]) Left 2000    knee arthroscopy       Family history reviewed and is not pertinent to presenting problem.     Social History    Tobacco Use      S has significant pain in his left lower abdomen, left lower back with restricted range of motion. MDM   Acute left lower back pain, left lower abdominal pain. No improvement with IV Toradol in clinic. CT abdomen unremarkable.   Patient lives by Sherrye Boys

## (undated) NOTE — LETTER
10/05/17    Patient: Jcarlos Vasquez  : 1965 Visit date: 10/5/2017    Dear  Dr. Tino Bates MD,    Thank you for referring Jcarlos Vasquez to my practice. Please find my assessment and plan below.                 Assessment   Acute diverticulitis  (prima Clinical exam reveals the abdomen to be soft, nontender, nondistended, good bowel sounds. His only tenderness is to very deep palpation in the left lower quadrant.   He has a previous well-healed lengthy hand-assisted laparoscopic incision in the hypogastr

## (undated) NOTE — LETTER
17    Patient: Ciara Trujillo  : 1965 Visit date: 2017    Dear  Dr. Concepcion Heck MD,    Thank you for referring Ciara Trujillo to my practice. Please find my assessment and plan below.         Assessment   Acute diverticulitis  (primary encou abdomen is soft, nontender to light and deep palpation specifically within the left lower quadrant, nondistended, with active bowel sounds present. All previous surgical incisions are clean, dry, intact, without erythema or drainage.   The patient has no a Sincerely,       Kayy Barillas MD   CC: No Recipients

## (undated) NOTE — ED AVS SNAPSHOT
Berto Strickland   MRN: [id number]    Department:  [facility] Emergency Department   Date of Visit:  10/30/2018           Disclosure     Insurance plans vary and the physician(s) referred by the ER may not be covered by your plan.  Please contact you tell this physician (or your personal doctor if your instructions are to return to your personal doctor) about any new or lasting problems. The primary care or specialist physician will see patients referred from the [facility] Emergency Department.  Jarad Capone